# Patient Record
Sex: FEMALE | Race: WHITE | NOT HISPANIC OR LATINO | Employment: FULL TIME | URBAN - METROPOLITAN AREA
[De-identification: names, ages, dates, MRNs, and addresses within clinical notes are randomized per-mention and may not be internally consistent; named-entity substitution may affect disease eponyms.]

---

## 2022-03-01 ENCOUNTER — HOSPITAL ENCOUNTER (OUTPATIENT)
Dept: RADIOLOGY | Facility: MEDICAL CENTER | Age: 62
End: 2022-03-01
Attending: STUDENT IN AN ORGANIZED HEALTH CARE EDUCATION/TRAINING PROGRAM
Payer: COMMERCIAL

## 2022-03-01 DIAGNOSIS — Z12.31 VISIT FOR SCREENING MAMMOGRAM: ICD-10-CM

## 2022-03-01 PROCEDURE — 77063 BREAST TOMOSYNTHESIS BI: CPT

## 2022-07-22 ENCOUNTER — HOSPITAL ENCOUNTER (OUTPATIENT)
Facility: MEDICAL CENTER | Age: 62
End: 2022-07-22
Attending: PHYSICIAN ASSISTANT
Payer: COMMERCIAL

## 2022-07-22 ENCOUNTER — OFFICE VISIT (OUTPATIENT)
Dept: URGENT CARE | Facility: PHYSICIAN GROUP | Age: 62
End: 2022-07-22
Payer: COMMERCIAL

## 2022-07-22 VITALS
HEART RATE: 94 BPM | SYSTOLIC BLOOD PRESSURE: 134 MMHG | RESPIRATION RATE: 16 BRPM | BODY MASS INDEX: 24.98 KG/M2 | TEMPERATURE: 99.6 F | DIASTOLIC BLOOD PRESSURE: 78 MMHG | OXYGEN SATURATION: 93 % | WEIGHT: 141 LBS | HEIGHT: 63 IN

## 2022-07-22 DIAGNOSIS — R50.9 FEVER, UNSPECIFIED FEVER CAUSE: ICD-10-CM

## 2022-07-22 DIAGNOSIS — J06.9 VIRAL UPPER RESPIRATORY TRACT INFECTION: ICD-10-CM

## 2022-07-22 DIAGNOSIS — M79.10 MYALGIA: ICD-10-CM

## 2022-07-22 DIAGNOSIS — R51.9 NONINTRACTABLE HEADACHE, UNSPECIFIED CHRONICITY PATTERN, UNSPECIFIED HEADACHE TYPE: ICD-10-CM

## 2022-07-22 PROCEDURE — 0240U HCHG SARS-COV-2 COVID-19 NFCT DS RESP RNA 3 TRGT MIC: CPT

## 2022-07-22 PROCEDURE — 99202 OFFICE O/P NEW SF 15 MIN: CPT | Performed by: PHYSICIAN ASSISTANT

## 2022-07-22 NOTE — LETTER
July 22, 2022    To Whom It May Concern:         This is confirmation that Cee Danielle attended her scheduled appointment with Alia Kaplan P.A.-C. on 7/22/22. Please excuse patient from work tomorrow 7/22 pending covid testing.         If you have any questions please do not hesitate to call me at the phone number listed below.    Sincerely,          Alia Kaplan P.A.-C.  [phone]

## 2022-07-23 DIAGNOSIS — R50.9 FEVER, UNSPECIFIED FEVER CAUSE: ICD-10-CM

## 2022-07-23 LAB
FLUAV RNA SPEC QL NAA+PROBE: NEGATIVE
FLUBV RNA SPEC QL NAA+PROBE: NEGATIVE
SARS-COV-2 RNA RESP QL NAA+PROBE: DETECTED
SPECIMEN SOURCE: ABNORMAL

## 2022-07-23 NOTE — PROGRESS NOTES
"Subjective:   Cee Danielle is a 61 y.o. female who presents for Headache (Body ache, x 1 day/)     Onset of symptoms at 11AM today.  Headache, myalgias.  Dry mouth, no ST, cough, or runny nose  No fevers at home; took tylenol at home  No SOB.  Vaccinated, no covid this year.    Medications:  This patient does not have an active medication from one of the medication groupers.    Allergies:             Patient has no allergy information on record.    Surgical History:       No past surgical history on file.    Past Social Hx:  Cee Danielle  reports that she has never smoked. She has never used smokeless tobacco. She reports previous alcohol use. She reports that she does not use drugs.     Past Family Hx:   Cee Danielle family history is not on file.       Problem list, medications, and allergies reviewed by myself today in Epic.     Objective:     /78 (BP Location: Left arm, Patient Position: Sitting, BP Cuff Size: Adult)   Pulse 94   Temp 37.6 °C (99.6 °F) (Temporal)   Resp 16   Ht 1.6 m (5' 3\")   Wt 64 kg (141 lb)   SpO2 93%   BMI 24.98 kg/m²     Physical Exam  Vitals and nursing note reviewed.   Constitutional:       General: She is not in acute distress.     Appearance: Normal appearance. She is not ill-appearing.   HENT:      Head: Normocephalic.      Jaw: No trismus.      Right Ear: External ear normal. No drainage. A middle ear effusion is present. No mastoid tenderness. Tympanic membrane is not erythematous or bulging.      Left Ear: External ear normal. No drainage. A middle ear effusion is present. No mastoid tenderness. Tympanic membrane is not erythematous or bulging.      Nose: Congestion and rhinorrhea present.      Right Turbinates: Enlarged and swollen.      Left Turbinates: Enlarged and swollen.      Mouth/Throat:      Mouth: Mucous membranes are moist.      Tongue: No lesions. Tongue does not deviate from midline.      Palate: No lesions.      Pharynx: Uvula midline. Posterior " oropharyngeal erythema present. No oropharyngeal exudate or uvula swelling.      Tonsils: No tonsillar exudate or tonsillar abscesses.   Eyes:      General:         Right eye: No discharge.         Left eye: No discharge.      Extraocular Movements: Extraocular movements intact.   Cardiovascular:      Rate and Rhythm: Normal rate and regular rhythm.      Pulses: Normal pulses.      Heart sounds: Normal heart sounds.   Pulmonary:      Effort: Pulmonary effort is normal. No accessory muscle usage or respiratory distress.      Breath sounds: Normal breath sounds and air entry. No stridor or decreased air movement. No wheezing, rhonchi or rales.   Musculoskeletal:      Cervical back: Normal range of motion.   Lymphadenopathy:      Head:      Right side of head: No tonsillar adenopathy.      Left side of head: No tonsillar adenopathy.      Cervical: No cervical adenopathy.   Skin:     General: Skin is warm.      Findings: No rash.   Neurological:      Mental Status: She is alert.   Psychiatric:         Behavior: Behavior is cooperative.         Assessment/Plan:     Diagnosis and Associated Orders:     There are no diagnoses linked to this encounter.      Comments/MDM:    The patient's presenting symptoms and exam findings most likely are due to a viral etiology.     Symptomatic and supportive care:   Plenty of oral hydration and rest   Over the counter cough suppressant as directed.  Tylenol or ibuprofen for pain and fever as directed.   Warm salt water gargles for sore throat, soft foods, cool liquids.   Saline nasal spray, Flonase, and/or otc sudafed (if no history of hypertension) as a decongestant.   Infection control measures at home. Stay away from people, Hand washing, covering sneeze/cough, disinfect surfaces.   Remain home from work, school, and other populated environments while ill.  Overall, the patient is well-appearing. They are not hypoxic, afebrile, and a normal pulmonary exam.    Test for COVID-19 and  influenza performed if applicable. Result will be reviewed by myself. We will call/message back for positive results only and appropriate further instructions. Instructed to sign up for [employer] if they have not already. Result will be automatically released to [facility] application for patient review.       I personally reviewed prior external notes and test results pertinent to today's visit.  Red flags discussed as well as indications to present to the Emergency Department.  Supportive care, natural history, differential diagnoses, and indications for immediate follow-up discussed.  Patient expresses understanding and agrees to plan.  Patient denies any other questions or concerns.    Follow-up with the primary care physician for recheck, reevaluation, and consideration of further management.      Please note that this dictation was created using voice recognition software. I have made a reasonable attempt to correct obvious errors, but I expect that there are errors of grammar and possibly content that I did not discover before finalizing the note.    This note was electronically signed by Alia Kaplan PA-C

## 2022-10-17 ENCOUNTER — HOSPITAL ENCOUNTER (OUTPATIENT)
Dept: LAB | Facility: MEDICAL CENTER | Age: 62
End: 2022-10-17
Attending: STUDENT IN AN ORGANIZED HEALTH CARE EDUCATION/TRAINING PROGRAM
Payer: COMMERCIAL

## 2022-10-17 LAB
25(OH)D3 SERPL-MCNC: 26 NG/ML (ref 30–100)
ALBUMIN SERPL BCP-MCNC: 4.6 G/DL (ref 3.2–4.9)
ALBUMIN/GLOB SERPL: 1.7 G/DL
ALP SERPL-CCNC: 110 U/L (ref 30–99)
ALT SERPL-CCNC: 10 U/L (ref 2–50)
ANION GAP SERPL CALC-SCNC: 13 MMOL/L (ref 7–16)
AST SERPL-CCNC: 18 U/L (ref 12–45)
BASOPHILS # BLD AUTO: 0.5 % (ref 0–1.8)
BASOPHILS # BLD: 0.03 K/UL (ref 0–0.12)
BILIRUB SERPL-MCNC: 0.3 MG/DL (ref 0.1–1.5)
BUN SERPL-MCNC: 17 MG/DL (ref 8–22)
CALCIUM SERPL-MCNC: 9.4 MG/DL (ref 8.5–10.5)
CHLORIDE SERPL-SCNC: 104 MMOL/L (ref 96–112)
CO2 SERPL-SCNC: 24 MMOL/L (ref 20–33)
CREAT SERPL-MCNC: 0.67 MG/DL (ref 0.5–1.4)
EOSINOPHIL # BLD AUTO: 0.1 K/UL (ref 0–0.51)
EOSINOPHIL NFR BLD: 1.7 % (ref 0–6.9)
ERYTHROCYTE [DISTWIDTH] IN BLOOD BY AUTOMATED COUNT: 48.5 FL (ref 35.9–50)
GFR SERPLBLD CREATININE-BSD FMLA CKD-EPI: 99 ML/MIN/1.73 M 2
GLOBULIN SER CALC-MCNC: 2.7 G/DL (ref 1.9–3.5)
GLUCOSE SERPL-MCNC: 93 MG/DL (ref 65–99)
HCT VFR BLD AUTO: 41.3 % (ref 37–47)
HGB BLD-MCNC: 13.3 G/DL (ref 12–16)
IMM GRANULOCYTES # BLD AUTO: 0.01 K/UL (ref 0–0.11)
IMM GRANULOCYTES NFR BLD AUTO: 0.2 % (ref 0–0.9)
LYMPHOCYTES # BLD AUTO: 2.36 K/UL (ref 1–4.8)
LYMPHOCYTES NFR BLD: 40.3 % (ref 22–41)
MCH RBC QN AUTO: 30.5 PG (ref 27–33)
MCHC RBC AUTO-ENTMCNC: 32.2 G/DL (ref 33.6–35)
MCV RBC AUTO: 94.7 FL (ref 81.4–97.8)
MONOCYTES # BLD AUTO: 0.28 K/UL (ref 0–0.85)
MONOCYTES NFR BLD AUTO: 4.8 % (ref 0–13.4)
NEUTROPHILS # BLD AUTO: 3.08 K/UL (ref 2–7.15)
NEUTROPHILS NFR BLD: 52.5 % (ref 44–72)
NRBC # BLD AUTO: 0 K/UL
NRBC BLD-RTO: 0 /100 WBC
PLATELET # BLD AUTO: 257 K/UL (ref 164–446)
PMV BLD AUTO: 10.2 FL (ref 9–12.9)
POTASSIUM SERPL-SCNC: 4.3 MMOL/L (ref 3.6–5.5)
PROT SERPL-MCNC: 7.3 G/DL (ref 6–8.2)
RBC # BLD AUTO: 4.36 M/UL (ref 4.2–5.4)
SODIUM SERPL-SCNC: 141 MMOL/L (ref 135–145)
WBC # BLD AUTO: 5.9 K/UL (ref 4.8–10.8)

## 2022-10-17 PROCEDURE — 80053 COMPREHEN METABOLIC PANEL: CPT

## 2022-10-17 PROCEDURE — 36415 COLL VENOUS BLD VENIPUNCTURE: CPT

## 2022-10-17 PROCEDURE — 85025 COMPLETE CBC W/AUTO DIFF WBC: CPT

## 2022-10-17 PROCEDURE — 82306 VITAMIN D 25 HYDROXY: CPT

## 2025-01-08 ENCOUNTER — OFFICE VISIT (OUTPATIENT)
Dept: MEDICAL GROUP | Facility: PHYSICIAN GROUP | Age: 65
End: 2025-01-08
Payer: COMMERCIAL

## 2025-01-08 VITALS
SYSTOLIC BLOOD PRESSURE: 166 MMHG | DIASTOLIC BLOOD PRESSURE: 98 MMHG | OXYGEN SATURATION: 94 % | BODY MASS INDEX: 27.76 KG/M2 | TEMPERATURE: 97.3 F | HEART RATE: 75 BPM | WEIGHT: 162.6 LBS | HEIGHT: 64 IN | RESPIRATION RATE: 14 BRPM

## 2025-01-08 DIAGNOSIS — Z13.21 SCREENING FOR ENDOCRINE, NUTRITIONAL, METABOLIC AND IMMUNITY DISORDER: ICD-10-CM

## 2025-01-08 DIAGNOSIS — Z12.31 ENCOUNTER FOR SCREENING MAMMOGRAM FOR MALIGNANT NEOPLASM OF BREAST: ICD-10-CM

## 2025-01-08 DIAGNOSIS — Z11.4 SCREENING FOR HIV (HUMAN IMMUNODEFICIENCY VIRUS): ICD-10-CM

## 2025-01-08 DIAGNOSIS — R12 HEARTBURN: ICD-10-CM

## 2025-01-08 DIAGNOSIS — Z13.29 SCREENING FOR ENDOCRINE, NUTRITIONAL, METABOLIC AND IMMUNITY DISORDER: ICD-10-CM

## 2025-01-08 DIAGNOSIS — Z11.59 NEED FOR HEPATITIS C SCREENING TEST: ICD-10-CM

## 2025-01-08 DIAGNOSIS — R19.5 POSITIVE COLORECTAL CANCER SCREENING USING COLOGUARD TEST: ICD-10-CM

## 2025-01-08 DIAGNOSIS — Z13.228 SCREENING FOR ENDOCRINE, NUTRITIONAL, METABOLIC AND IMMUNITY DISORDER: ICD-10-CM

## 2025-01-08 DIAGNOSIS — F51.01 PRIMARY INSOMNIA: ICD-10-CM

## 2025-01-08 DIAGNOSIS — Z13.0 SCREENING FOR ENDOCRINE, NUTRITIONAL, METABOLIC AND IMMUNITY DISORDER: ICD-10-CM

## 2025-01-08 PROBLEM — K63.5 POLYP OF COLON: Status: ACTIVE | Noted: 2023-12-11

## 2025-01-08 PROCEDURE — 3077F SYST BP >= 140 MM HG: CPT

## 2025-01-08 PROCEDURE — 99214 OFFICE O/P EST MOD 30 MIN: CPT

## 2025-01-08 PROCEDURE — 3080F DIAST BP >= 90 MM HG: CPT

## 2025-01-08 ASSESSMENT — PATIENT HEALTH QUESTIONNAIRE - PHQ9: CLINICAL INTERPRETATION OF PHQ2 SCORE: 0

## 2025-01-08 NOTE — PROGRESS NOTES
Verbal consent was acquired by the patient to use [employer] ambient listening note generation during this visit     Subjective:     HPI:   History of Present Illness  The patient is a 64-year-old female presenting to establish care. She reports that it has been several years since she was last seen by her primary care provider.    She has no history of surgical interventions or significant past medical conditions. Her obstetric history includes 3 pregnancies, resulting in 3 living children, all of whom are healthy. She is currently sexually active. She has not been monitoring her blood pressure at home recently, although she owns a blood pressure cuff. She reports no symptoms of headache, chest pain, or shortness of breath. Her dietary habits include a moderate intake of salt and a high consumption of water.    She has been experiencing intermittent heartburn for the past 3 to 4 months, which is exacerbated when lying down in certain positions. She does not consume food before bedtime. She has found relief with Tums.    She also reports occasional sleep disturbances, often waking up early due to worry. Her sleep schedule typically involves going to bed around 9 or 10 PM and waking up between 5 and 6 AM. She is currently unemployed. She reports good quality sleep once asleep but occasionally struggles with initiating sleep.    She had a positive Cologuard test and underwent a colonoscopy in 2023, which was normal. She was advised to return for a follow-up but did not have insurance at the time. Her last mammogram was in March 2022. It has been a while since her last Pap smear, but she has never had an abnormal result.    SOCIAL HISTORY  She does not vape, smoke, or use alcohol. She has no history of drug use, including marijuana.    FAMILY HISTORY  She reports no family history of cancers, diabetes, heart disease, high blood pressure, high cholesterol, or strokes.    MEDICATIONS  Current: Tums        Assessment &  Plan:     Problem List Items Addressed This Visit       Positive colorectal cancer screening using Cologuard test    Relevant Orders    Referral to Gastroenterology    Heartburn    Relevant Medications    omeprazole (PRILOSEC) 20 MG delayed-release capsule     Other Visit Diagnoses       Primary insomnia        Screening for endocrine, nutritional, metabolic and immunity disorder        Relevant Orders    CBC WITHOUT DIFFERENTIAL    Lipid Profile    TSH WITH REFLEX TO FT4    Comp Metabolic Panel    Screening for HIV (human immunodeficiency virus)        Relevant Orders    HIV AG/AB COMBO ASSAY SCREENING    Need for hepatitis C screening test        Relevant Orders    HEP C VIRUS ANTIBODY    Encounter for screening mammogram for malignant neoplasm of breast        Relevant Orders    MA-SCREENING MAMMO BILAT W/CAD            Assessment & Plan  1. Health maintenance.  [facility] will be initiated. An order for a mammogram has been placed. A Pap smear is scheduled for the summer. A comprehensive panel of labs, including liver function, kidney function, blood counts, cholesterol, and thyroid, will be ordered. She is advised to follow up with the GI department within a week if there is no communication regarding the colonoscopy or GI referral.    2. Elevated BP reading  New findings.    She is advised to monitor her blood pressure at home several times a week, ensuring her feet are flat on the floor and she has been resting for 5 minutes prior to each reading. She is also encouraged to limit her sodium intake to between 2500 and 3000 mg per day. If her blood pressure remains persistently high, further discussion regarding management will be necessary.    3. Gastroesophageal reflux disease (GERD).  Chronic and ongoing   She is provided with a printout detailing foods to avoid and tips for managing GERD. A prescription for omeprazole 20 mg has been sent to [facility], to be taken approximately an hour before  dinner. She is advised to avoid eating before bedtime.    4. Insomnia.  Chronic, not at goal.    She is recommended to try a magnesium supplement called Calm, which contains a blend of 3 different types of magnesium, to aid in sleep.     Follow-up  The patient is scheduled for a follow-up visit in 6 weeks.          Health Maintenance: Orders placed as applicable to patient     Objective:     Exam:  Objective:  Vitals:    01/08/25 0954   BP: (!) 166/98   Pulse: 75   Resp: 14   Temp: 36.3 °C (97.3 °F)   SpO2: 94%     Physical Exam  Physical Exam  Cardiovascular:      Rate and Rhythm: Normal rate and regular rhythm.   Pulmonary:      Effort: Pulmonary effort is normal.      Breath sounds: Normal breath sounds.         Constitutional:       Appearance: Normal appearance.   Eyes:      Extraocular Movements: Extraocular movements intact.   Pulmonary:      Effort: Pulmonary effort is normal.   Neurological:      General: No focal deficit present.      Mental Status: She is alert and oriented to person, place, and time.   Psychiatric:         Mood and Affect: Mood normal.         Behavior: Behavior normal.       Return in about 6 weeks (around 2/19/2025) for lab follow up.    Please note that this dictation was created using voice recognition software. I have made every reasonable attempt to correct obvious errors, but I expect that there are errors of grammar and possibly content that I did not discover before finalizing the note.      Reviewed notes from [facility] 11/2023

## 2025-01-22 DIAGNOSIS — R12 HEARTBURN: ICD-10-CM

## 2025-02-20 ENCOUNTER — HOSPITAL ENCOUNTER (OUTPATIENT)
Dept: LAB | Facility: MEDICAL CENTER | Age: 65
End: 2025-02-20
Payer: COMMERCIAL

## 2025-02-20 DIAGNOSIS — Z13.228 SCREENING FOR ENDOCRINE, NUTRITIONAL, METABOLIC AND IMMUNITY DISORDER: ICD-10-CM

## 2025-02-20 DIAGNOSIS — Z11.4 SCREENING FOR HIV (HUMAN IMMUNODEFICIENCY VIRUS): ICD-10-CM

## 2025-02-20 DIAGNOSIS — Z13.29 SCREENING FOR ENDOCRINE, NUTRITIONAL, METABOLIC AND IMMUNITY DISORDER: ICD-10-CM

## 2025-02-20 DIAGNOSIS — Z13.21 SCREENING FOR ENDOCRINE, NUTRITIONAL, METABOLIC AND IMMUNITY DISORDER: ICD-10-CM

## 2025-02-20 DIAGNOSIS — Z11.59 NEED FOR HEPATITIS C SCREENING TEST: ICD-10-CM

## 2025-02-20 DIAGNOSIS — Z13.0 SCREENING FOR ENDOCRINE, NUTRITIONAL, METABOLIC AND IMMUNITY DISORDER: ICD-10-CM

## 2025-02-20 LAB
ERYTHROCYTE [DISTWIDTH] IN BLOOD BY AUTOMATED COUNT: 47.1 FL (ref 35.9–50)
HCT VFR BLD AUTO: 42 % (ref 37–47)
HGB BLD-MCNC: 13.7 G/DL (ref 12–16)
MCH RBC QN AUTO: 29.8 PG (ref 27–33)
MCHC RBC AUTO-ENTMCNC: 32.6 G/DL (ref 32.2–35.5)
MCV RBC AUTO: 91.3 FL (ref 81.4–97.8)
PLATELET # BLD AUTO: 202 K/UL (ref 164–446)
PMV BLD AUTO: 10.8 FL (ref 9–12.9)
RBC # BLD AUTO: 4.6 M/UL (ref 4.2–5.4)
WBC # BLD AUTO: 6.4 K/UL (ref 4.8–10.8)

## 2025-02-20 PROCEDURE — 86803 HEPATITIS C AB TEST: CPT

## 2025-02-20 PROCEDURE — 80053 COMPREHEN METABOLIC PANEL: CPT

## 2025-02-20 PROCEDURE — 85027 COMPLETE CBC AUTOMATED: CPT

## 2025-02-20 PROCEDURE — 36415 COLL VENOUS BLD VENIPUNCTURE: CPT

## 2025-02-20 PROCEDURE — 80061 LIPID PANEL: CPT

## 2025-02-20 PROCEDURE — 84443 ASSAY THYROID STIM HORMONE: CPT

## 2025-02-20 PROCEDURE — 87389 HIV-1 AG W/HIV-1&-2 AB AG IA: CPT

## 2025-02-21 LAB
ALBUMIN SERPL BCP-MCNC: 4.3 G/DL (ref 3.2–4.9)
ALBUMIN/GLOB SERPL: 1.3 G/DL
ALP SERPL-CCNC: 99 U/L (ref 30–99)
ALT SERPL-CCNC: 12 U/L (ref 2–50)
ANION GAP SERPL CALC-SCNC: 13 MMOL/L (ref 7–16)
AST SERPL-CCNC: 26 U/L (ref 12–45)
BILIRUB SERPL-MCNC: 0.4 MG/DL (ref 0.1–1.5)
BUN SERPL-MCNC: 15 MG/DL (ref 8–22)
CALCIUM ALBUM COR SERPL-MCNC: 8.9 MG/DL (ref 8.5–10.5)
CALCIUM SERPL-MCNC: 9.1 MG/DL (ref 8.5–10.5)
CHLORIDE SERPL-SCNC: 107 MMOL/L (ref 96–112)
CHOLEST SERPL-MCNC: 203 MG/DL (ref 100–199)
CO2 SERPL-SCNC: 20 MMOL/L (ref 20–33)
CREAT SERPL-MCNC: 0.67 MG/DL (ref 0.5–1.4)
GFR SERPLBLD CREATININE-BSD FMLA CKD-EPI: 97 ML/MIN/1.73 M 2
GLOBULIN SER CALC-MCNC: 3.2 G/DL (ref 1.9–3.5)
GLUCOSE SERPL-MCNC: 82 MG/DL (ref 65–99)
HCV AB SER QL: NORMAL
HDLC SERPL-MCNC: 49 MG/DL
HIV 1+2 AB+HIV1 P24 AG SERPL QL IA: NORMAL
LDLC SERPL CALC-MCNC: 113 MG/DL
POTASSIUM SERPL-SCNC: 4.2 MMOL/L (ref 3.6–5.5)
PROT SERPL-MCNC: 7.5 G/DL (ref 6–8.2)
SODIUM SERPL-SCNC: 140 MMOL/L (ref 135–145)
TRIGL SERPL-MCNC: 206 MG/DL (ref 0–149)
TSH SERPL DL<=0.005 MIU/L-ACNC: 1.94 UIU/ML (ref 0.38–5.33)

## 2025-02-25 ENCOUNTER — OFFICE VISIT (OUTPATIENT)
Dept: MEDICAL GROUP | Facility: PHYSICIAN GROUP | Age: 65
End: 2025-02-25
Payer: COMMERCIAL

## 2025-02-25 VITALS
BODY MASS INDEX: 29.02 KG/M2 | DIASTOLIC BLOOD PRESSURE: 86 MMHG | HEIGHT: 63 IN | RESPIRATION RATE: 18 BRPM | OXYGEN SATURATION: 92 % | WEIGHT: 163.8 LBS | SYSTOLIC BLOOD PRESSURE: 146 MMHG | TEMPERATURE: 97.8 F | HEART RATE: 77 BPM

## 2025-02-25 DIAGNOSIS — R12 HEARTBURN: ICD-10-CM

## 2025-02-25 DIAGNOSIS — E78.1 HYPERTRIGLYCERIDEMIA: ICD-10-CM

## 2025-02-25 DIAGNOSIS — I10 PRIMARY HYPERTENSION: ICD-10-CM

## 2025-02-25 DIAGNOSIS — Z23 NEED FOR VACCINATION: ICD-10-CM

## 2025-02-25 PROCEDURE — 90677 PCV20 VACCINE IM: CPT

## 2025-02-25 PROCEDURE — 90471 IMMUNIZATION ADMIN: CPT

## 2025-02-25 PROCEDURE — 99214 OFFICE O/P EST MOD 30 MIN: CPT | Mod: 25

## 2025-02-25 PROCEDURE — 3077F SYST BP >= 140 MM HG: CPT

## 2025-02-25 PROCEDURE — 3079F DIAST BP 80-89 MM HG: CPT

## 2025-02-25 RX ORDER — TELMISARTAN 40 MG/1
40 TABLET ORAL DAILY
Qty: 100 TABLET | Refills: 3 | Status: SHIPPED | OUTPATIENT
Start: 2025-02-25 | End: 2026-04-01

## 2025-02-25 ASSESSMENT — FIBROSIS 4 INDEX: FIB4 SCORE: 2.38

## 2025-02-27 ENCOUNTER — RESULTS FOLLOW-UP (OUTPATIENT)
Dept: MEDICAL GROUP | Facility: PHYSICIAN GROUP | Age: 65
End: 2025-02-27

## 2025-02-28 ENCOUNTER — HOSPITAL ENCOUNTER (OUTPATIENT)
Dept: RADIOLOGY | Facility: MEDICAL CENTER | Age: 65
End: 2025-02-28
Payer: COMMERCIAL

## 2025-02-28 DIAGNOSIS — Z12.31 ENCOUNTER FOR SCREENING MAMMOGRAM FOR MALIGNANT NEOPLASM OF BREAST: ICD-10-CM

## 2025-02-28 PROCEDURE — 77067 SCR MAMMO BI INCL CAD: CPT

## 2025-02-28 NOTE — PROGRESS NOTES
Verbal consent was acquired by the patient to use [employer] ambient listening note generation during this visit     Subjective:     HPI:   History of Present Illness  The patient is a 64-year-old female presenting for a follow-up on laboratory results. She was referred to a gastroenterologist for gastroesophageal reflux disease (GERD) and initiated on omeprazole 20 mg daily. She was advised to monitor her blood pressure (BP) at home, with her last recorded BP being 166/98 mmHg during the previous visit.    The patient reports experiencing intermittent reflux symptoms, which have shown improvement with the use of omeprazole. She is scheduled for a colonoscopy on 04/01/2025.    Home BP readings have been in the range of 130-140 mmHg, with no associated symptoms reported. The patient admits to a high intake of salt, chips, and sugar.    She expresses concern about weight gain despite engaging in regular exercise and maintaining a balanced diet. Additionally, she is planning to transition to Medicare this year.    MEDICATIONS  omeprazole        Assessment & Plan:     Problem List Items Addressed This Visit       Heartburn     Other Visit Diagnoses         Primary hypertension        Relevant Medications    telmisartan (MICARDIS) 40 MG Tab      Need for vaccination        Relevant Orders    Pneumococcal Conjugate Vaccine 20-Valent (6 wks+) (Completed)              Assessment & Plan  1. GERD: Omeprazole 20 mg daily helps, occasional reflux persists. GI appointment on 04/01/2025 for colonoscopy.  - Continue omeprazole 20 mg daily  - [facility] for [facility]oscopy on 04/01/2025    2. Hypertension: /86 today (previously 166/98). Home readings 130-140 systolic.  - Advise reducing salt intake and increasing water intake  - Start telmisartan 40mg  QHS at night  - Discussed potential side effects including rare persistent dry cough and initial dizziness or vertigo    3. Elevated Triglycerides: Chronic and  ongoing   Triglycerides Slightly elevated.  Lab Results   Component Value Date/Time    CHOLSTRLTOT 203 (H) 02/20/2025 10:14 AM    TRIGLYCERIDE 206 (H) 02/20/2025 10:14 AM    HDL 49 02/20/2025 10:14 AM     (H) 02/20/2025 10:14 AM   ]    - Advise healthier snacks such as protein chips and nuts  - Telmisartan may help lower triglycerides    4. Weight Gain: Reports weight gain despite exercise and diet.  - Discussed GLP-1 medications, prefers to wait  - Advise weight-bearing exercises to increase muscle mass and metabolic rate    5. Health Maintenance: Pneumonia vaccine today per CDC recommendation.  - Administered pneumonia vaccine today      Follow-up  - Recheck BP and medication tolerance in 4-6 weeks      Health Maintenance: Orders placed as applicable to patient     Objective:     Exam:  Objective:  Vitals:    02/25/25 1011   BP: (!) 146/86   Pulse: 77   Resp: 18   Temp: 36.6 °C (97.8 °F)   SpO2: 92%     Physical Exam  Physical Exam    Constitutional:       Appearance: Normal appearance.   Eyes:      Extraocular Movements: Extraocular movements intact.   Pulmonary:      Effort: Pulmonary effort is normal.   Neurological:      General: No focal deficit present.      Mental Status: She is alert and oriented to person, place, and time.   Psychiatric:         Mood and Affect: Mood normal.         Behavior: Behavior normal.       Return in about 8 weeks (around 4/22/2025) for HTN follow up.    Please note that this dictation was created using voice recognition software. I have made every reasonable attempt to correct obvious errors, but I expect that there are errors of grammar and possibly content that I did not discover before finalizing the note.

## 2025-03-05 ENCOUNTER — RESULTS FOLLOW-UP (OUTPATIENT)
Dept: MEDICAL GROUP | Facility: PHYSICIAN GROUP | Age: 65
End: 2025-03-05
Payer: COMMERCIAL

## 2025-03-06 NOTE — TELEPHONE ENCOUNTER
Phone Number Called: [phone] (home)       Call outcome:  Unable to leave voicemail    Message: will try tomorrow

## 2025-03-06 NOTE — TELEPHONE ENCOUNTER
----- Message from Nurse Practitioner RAKESH Sung sent at 3/5/2025  7:38 AM PST -----  Krystle Mike,    I have reviewed the results of your mammogram and it showed that you have scattered areas of fibroglandular tissue (normal for you) with no evidence of malignancy or masses.  We will repeat your screening screening mammogram in one year if you have a family history of breast cancer or risk factors for breast cancer, or two years if you have low risk factors. Please let me know if you have any questions or feel free to make an appointment with me if you would like to discuss this further.    Thank you,  ANITA Sung

## 2025-04-22 ENCOUNTER — OFFICE VISIT (OUTPATIENT)
Dept: MEDICAL GROUP | Facility: PHYSICIAN GROUP | Age: 65
End: 2025-04-22
Payer: COMMERCIAL

## 2025-04-22 VITALS
HEART RATE: 61 BPM | BODY MASS INDEX: 28.17 KG/M2 | WEIGHT: 159 LBS | HEIGHT: 63 IN | OXYGEN SATURATION: 92 % | TEMPERATURE: 97.2 F

## 2025-04-22 DIAGNOSIS — I10 PRIMARY HYPERTENSION: ICD-10-CM

## 2025-04-22 DIAGNOSIS — D12.6 COLON ADENOMAS: ICD-10-CM

## 2025-04-22 DIAGNOSIS — E78.1 HYPERTRIGLYCERIDEMIA: ICD-10-CM

## 2025-04-22 PROCEDURE — 99214 OFFICE O/P EST MOD 30 MIN: CPT

## 2025-04-22 RX ORDER — TELMISARTAN 80 MG/1
80 TABLET ORAL DAILY
Qty: 100 TABLET | Refills: 3 | Status: SHIPPED | OUTPATIENT
Start: 2025-04-22 | End: 2026-05-27

## 2025-04-22 ASSESSMENT — FIBROSIS 4 INDEX: FIB4 SCORE: 2.38

## 2025-04-22 NOTE — PROGRESS NOTES
Verbal consent was acquired by the patient to use [employer] ambient listening note generation during this visit     Subjective:     HPI:   History of Present Illness  The patient is a 64-year-old female presenting for a follow-up appointment. She was initiated on telmisartan 40 mg at bedtime for hypertension management.    Her systolic blood pressure ranges from 130 to 140 mmHg. She reports no adverse effects from telmisartan and denies experiencing headaches, chest pain, or shortness of breath. She consumes wine occasionally but does not engage in heavy drinking.    A colonoscopy performed last week revealed 10 adenomatous polyps, which were removed. A repeat colonoscopy is scheduled in 6 months.    SOCIAL HISTORY  Occasional wine consumption, not a heavy drinker.        Assessment & Plan:     Problem List Items Addressed This Visit       Primary hypertension    Relevant Medications    telmisartan (MICARDIS) 80 MG Tab    Hypertriglyceridemia    Relevant Medications    telmisartan (MICARDIS) 80 MG Tab    Other Relevant Orders    Lipid Profile    Colon adenomas         Assessment & Plan  1. Hypertension: Chronic, not at goal.    - Home BP: 130s-140s systolic, 30-40 diastolic.  - No side effects with telmisartan; increase dose to 80mg QHS.   - Target BP: 120-130/70-80.  - Prescription for increased dose sent to [facility].  - Occasional wine acceptable.  - Recheck BP before leaving.    2. Hyperlipidemia. This is a chronic, stable medical condition.   - Telmisartan aids in lowering triglycerides.  - Recheck cholesterol labs in 2 months.  - Lab order placed; advised to get labs before next visit.    3. Colon polyps: Precancerous.  - Multiple adenomas removed during last colonoscopy.  - Repeat colonoscopy in 6 months.  - If no new polyps, subsequent colonoscopies every 3 years.    Follow-up  - Follow-up in 2 months.      Health Maintenance: Orders placed as applicable to patient     Objective:      Exam:  Objective:  Vitals:    04/22/25 0918   Pulse: 61   Temp: 36.2 °C (97.2 °F)   SpO2: 92%     Physical Exam  Physical Exam    Constitutional:       Appearance: Normal appearance.   Eyes:      Extraocular Movements: Extraocular movements intact.   Pulmonary:      Effort: Pulmonary effort is normal.   Neurological:      General: No focal deficit present.      Mental Status: She is alert and oriented to person, place, and time.   Psychiatric:         Mood and Affect: Mood normal.         Behavior: Behavior normal.       Return in about 2 months (around 6/22/2025) for HTN follow up.    Please note that this dictation was created using voice recognition software. I have made every reasonable attempt to correct obvious errors, but I expect that there are errors of grammar and possibly content that I did not discover before finalizing the note.

## 2025-06-16 ENCOUNTER — HOSPITAL ENCOUNTER (OUTPATIENT)
Dept: LAB | Facility: MEDICAL CENTER | Age: 65
End: 2025-06-16
Payer: COMMERCIAL

## 2025-06-16 DIAGNOSIS — E78.1 HYPERTRIGLYCERIDEMIA: ICD-10-CM

## 2025-06-16 LAB
CHOLEST SERPL-MCNC: 187 MG/DL (ref 100–199)
FASTING STATUS PATIENT QL REPORTED: NORMAL
HDLC SERPL-MCNC: 40 MG/DL
LDLC SERPL CALC-MCNC: 88 MG/DL
TRIGL SERPL-MCNC: 294 MG/DL (ref 0–149)

## 2025-06-16 PROCEDURE — 80061 LIPID PANEL: CPT

## 2025-06-16 PROCEDURE — 36415 COLL VENOUS BLD VENIPUNCTURE: CPT

## 2025-06-17 ENCOUNTER — RESULTS FOLLOW-UP (OUTPATIENT)
Dept: MEDICAL GROUP | Facility: PHYSICIAN GROUP | Age: 65
End: 2025-06-17

## 2025-06-19 ENCOUNTER — TELEPHONE (OUTPATIENT)
Dept: MEDICAL GROUP | Facility: PHYSICIAN GROUP | Age: 65
End: 2025-06-19
Payer: COMMERCIAL

## 2025-06-19 NOTE — TELEPHONE ENCOUNTER
Phone Number Called: [phone]     Call outcome: could not leave message vm not set up patient has appointment on 06/23 to go over lab results

## 2025-06-23 ENCOUNTER — OFFICE VISIT (OUTPATIENT)
Dept: MEDICAL GROUP | Facility: PHYSICIAN GROUP | Age: 65
End: 2025-06-23
Payer: COMMERCIAL

## 2025-06-23 VITALS
OXYGEN SATURATION: 94 % | HEIGHT: 63 IN | DIASTOLIC BLOOD PRESSURE: 86 MMHG | WEIGHT: 162.2 LBS | BODY MASS INDEX: 28.74 KG/M2 | SYSTOLIC BLOOD PRESSURE: 176 MMHG | TEMPERATURE: 98.4 F | HEART RATE: 79 BPM | RESPIRATION RATE: 16 BRPM

## 2025-06-23 DIAGNOSIS — I10 PRIMARY HYPERTENSION: Primary | ICD-10-CM

## 2025-06-23 DIAGNOSIS — R20.2 NUMBNESS AND TINGLING: ICD-10-CM

## 2025-06-23 DIAGNOSIS — B00.2 ORAL HERPES: ICD-10-CM

## 2025-06-23 DIAGNOSIS — R20.0 NUMBNESS AND TINGLING: ICD-10-CM

## 2025-06-23 PROCEDURE — 3077F SYST BP >= 140 MM HG: CPT

## 2025-06-23 PROCEDURE — 3079F DIAST BP 80-89 MM HG: CPT

## 2025-06-23 PROCEDURE — 99214 OFFICE O/P EST MOD 30 MIN: CPT

## 2025-06-23 RX ORDER — HYDROCHLOROTHIAZIDE 12.5 MG/1
12.5 TABLET ORAL DAILY
Qty: 30 TABLET | Refills: 3 | Status: SHIPPED | OUTPATIENT
Start: 2025-06-23

## 2025-06-23 RX ORDER — VALACYCLOVIR HYDROCHLORIDE 1 G/1
1000 TABLET, FILM COATED ORAL 2 TIMES DAILY
Qty: 14 TABLET | Refills: 3 | Status: SHIPPED | OUTPATIENT
Start: 2025-06-23 | End: 2025-06-30

## 2025-06-23 ASSESSMENT — FIBROSIS 4 INDEX: FIB4 SCORE: 2.38

## 2025-06-23 NOTE — PROGRESS NOTES
Verbal consent was acquired by the patient to use [employer] ambient listening note generation during this visit     Subjective:     HPI:   History of Present Illness  The patient, a 64-year-old female, presents for follow-up regarding hypertension and laboratory results.    Home blood pressure readings consistently range from 160 to 170 mmHg. She administers her antihypertensive medication between 7:00-8:00 AM and monitors her blood pressure in the afternoon and evening. The patient has a preference for salty foods and consumes a significant amount of chips. She is currently unemployed, consumes wine on weekends, and denies smoking or other substance use.    The patient reports experiencing paresthesia in her toes, predominantly in the left foot, lasting approximately one hour and occurring intermittently during periods of sitting and standing, primarily while seated. She also describes occasional cramps in her toes upon waking, which have been present for several months. There is no history of foot fractures, severe strains, sprains, or ankle injuries. The paresthesia and tingling occasionally ascend up the leg, though she does not report any back pain or difficulty with foot dorsiflexion while walking. She describes a wrapping pattern of paresthesia and tingling in her toes. Mild paresthesia is noted on the right side, less severe than on the left. She experiences occasional leg cramps that resolve upon ambulation. The patient is taking over-the-counter magnesium supplements.    She has a history of herpes labialis and currently has six lesions. She has attempted over-the-counter treatments for one week with partial relief.    The patient fell one month ago, tripping over a crack and sustaining a face-first fall that resulted in a minor epistaxis. Pain was managed with acetaminophen and ibuprofen. She did not experience dizziness or lightheadedness prior to the fall. Her nose was slightly swollen but is  recovering. She reports no post-fall dizziness or syncope but perceives weakness in her left leg.    The patient does not snore but reports experiencing fatigue.    SOCIAL HISTORY  Drinks wine on weekends. No smoking or substance use. Currently unemployed.        Assessment & Plan:     Problem List Items Addressed This Visit       Primary hypertension - Primary    Relevant Medications    hydroCHLOROthiazide 12.5 MG tablet    Other Relevant Orders    Comp Metabolic Panel     Other Visit Diagnoses         Oral herpes        Relevant Medications    valacyclovir (VALTREX) 1 g Tab      Numbness and tingling        Relevant Orders    Referral to Pain Clinic              Assessment & Plan  1. Hypertension: Chronic, uncontrolled.   - BP readings 160s-170s despite increased antihypertensive medication.  - Advised to limit sodium intake to 3000 mg/day and monitor diet.  - Telmisartan at night, hydrochlorothiazide in morning.  - Lab test for kidney function and electrolytes.    2. Numbness and tingling in toes: Chronic. Undiagnosed concern with uncertain prognosis. Prediminantly affects 2nd-5th toes, occasionally the great toe. Occasionally affects the right toes. Nonspecific dermatomal distribution.   - Numbness and tingling in toes, mainly left foot, for 2 months.  - Decreased sensation to sharp and dull sensation in the LLE.   - Advised magnesium at night  - EMG if no improvement with magnesium.    3. Cold sores: Acute.  - Cold sores for a week; OTC treatments partially effective.  - Valacyclovir 500 mg twice daily for one week; start medication at initial symptoms.  - Counseling on early antiviral intervention.    4. Nasal injury: Acute.  - Fell a month ago, minor nasal swelling.  - Apply ice daily; spoon method for relief.  - Imaging if breathing difficulties arise.    Follow-up in 6 weeks.    Return in about 6 weeks (around 8/4/2025) for HTN follow up.    Health Maintenance: Orders placed as applicable to patient      Objective:     Exam:  Objective:  Vitals:    06/23/25 1032   BP: (!) 176/86   Pulse: 79   Resp: 16   Temp: 36.9 °C (98.4 °F)   SpO2: 94%     Physical Exam  Physical Exam  Neurological:      Sensory: Sensory deficit present.      Motor: Motor function is intact. No pronator drift.      Comments: RLE, nonspecific pattern         Constitutional:       Appearance: Normal appearance.   Eyes:      Extraocular Movements: Extraocular movements intact.   Pulmonary:      Effort: Pulmonary effort is normal.   Neurological:      General: No focal deficit present.      Mental Status: She is alert and oriented to person, place, and time.   Psychiatric:         Mood and Affect: Mood normal.         Behavior: Behavior normal.       Please note that this dictation was created using voice recognition software. I have made every reasonable attempt to correct obvious errors, but I expect that there are errors of grammar and possibly content that I did not discover before finalizing the note.

## 2025-08-08 ENCOUNTER — HOSPITAL ENCOUNTER (OUTPATIENT)
Dept: LAB | Facility: MEDICAL CENTER | Age: 65
End: 2025-08-08
Payer: COMMERCIAL

## 2025-08-08 DIAGNOSIS — I10 PRIMARY HYPERTENSION: ICD-10-CM

## 2025-08-08 LAB
ALBUMIN SERPL BCP-MCNC: 4.3 G/DL (ref 3.2–4.9)
ALBUMIN/GLOB SERPL: 1.3 G/DL
ALP SERPL-CCNC: 102 U/L (ref 30–99)
ALT SERPL-CCNC: 14 U/L (ref 2–50)
ANION GAP SERPL CALC-SCNC: 13 MMOL/L (ref 7–16)
AST SERPL-CCNC: 26 U/L (ref 12–45)
BILIRUB SERPL-MCNC: 0.4 MG/DL (ref 0.1–1.5)
BUN SERPL-MCNC: 15 MG/DL (ref 8–22)
CALCIUM ALBUM COR SERPL-MCNC: 9.4 MG/DL (ref 8.5–10.5)
CALCIUM SERPL-MCNC: 9.6 MG/DL (ref 8.5–10.5)
CHLORIDE SERPL-SCNC: 103 MMOL/L (ref 96–112)
CO2 SERPL-SCNC: 21 MMOL/L (ref 20–33)
CREAT SERPL-MCNC: 0.8 MG/DL (ref 0.5–1.4)
GFR SERPLBLD CREATININE-BSD FMLA CKD-EPI: 82 ML/MIN/1.73 M 2
GLOBULIN SER CALC-MCNC: 3.4 G/DL (ref 1.9–3.5)
GLUCOSE SERPL-MCNC: 88 MG/DL (ref 65–99)
POTASSIUM SERPL-SCNC: 3.9 MMOL/L (ref 3.6–5.5)
PROT SERPL-MCNC: 7.7 G/DL (ref 6–8.2)
SODIUM SERPL-SCNC: 137 MMOL/L (ref 135–145)

## 2025-08-08 PROCEDURE — 36415 COLL VENOUS BLD VENIPUNCTURE: CPT

## 2025-08-08 PROCEDURE — 80053 COMPREHEN METABOLIC PANEL: CPT

## 2025-08-18 ENCOUNTER — TELEPHONE (OUTPATIENT)
Dept: MEDICAL GROUP | Facility: PHYSICIAN GROUP | Age: 65
End: 2025-08-18
Payer: COMMERCIAL

## 2025-08-26 ENCOUNTER — TELEPHONE (OUTPATIENT)
Dept: MEDICAL GROUP | Facility: PHYSICIAN GROUP | Age: 65
End: 2025-08-26
Payer: COMMERCIAL

## 2025-08-27 ENCOUNTER — OFFICE VISIT (OUTPATIENT)
Dept: MEDICAL GROUP | Facility: PHYSICIAN GROUP | Age: 65
End: 2025-08-27
Payer: COMMERCIAL

## 2025-08-27 ENCOUNTER — APPOINTMENT (OUTPATIENT)
Dept: MEDICAL GROUP | Facility: PHYSICIAN GROUP | Age: 65
End: 2025-08-27
Payer: COMMERCIAL

## 2025-08-27 VITALS
DIASTOLIC BLOOD PRESSURE: 80 MMHG | WEIGHT: 163 LBS | SYSTOLIC BLOOD PRESSURE: 122 MMHG | BODY MASS INDEX: 28.88 KG/M2 | HEART RATE: 72 BPM | TEMPERATURE: 97 F | RESPIRATION RATE: 20 BRPM | OXYGEN SATURATION: 93 % | HEIGHT: 63 IN

## 2025-08-27 DIAGNOSIS — B00.1 COLD SORE: ICD-10-CM

## 2025-08-27 DIAGNOSIS — E66.3 OVERWEIGHT: ICD-10-CM

## 2025-08-27 DIAGNOSIS — E78.1 HYPERTRIGLYCERIDEMIA: ICD-10-CM

## 2025-08-27 DIAGNOSIS — K21.9 GASTROESOPHAGEAL REFLUX DISEASE WITHOUT ESOPHAGITIS: Primary | ICD-10-CM

## 2025-08-27 DIAGNOSIS — I10 PRIMARY HYPERTENSION: ICD-10-CM

## 2025-08-27 PROCEDURE — 99214 OFFICE O/P EST MOD 30 MIN: CPT | Performed by: INTERNAL MEDICINE

## 2025-08-27 PROCEDURE — 3074F SYST BP LT 130 MM HG: CPT | Performed by: INTERNAL MEDICINE

## 2025-08-27 PROCEDURE — 3079F DIAST BP 80-89 MM HG: CPT | Performed by: INTERNAL MEDICINE

## 2025-08-27 RX ORDER — VALACYCLOVIR HYDROCHLORIDE 1 G/1
1000 TABLET, FILM COATED ORAL 2 TIMES DAILY
COMMUNITY
Start: 2025-07-05 | End: 2025-07-12

## 2025-08-27 RX ORDER — HYDROCHLOROTHIAZIDE 12.5 MG/1
12.5 TABLET ORAL DAILY
Qty: 30 TABLET | Refills: 3 | Status: SHIPPED | OUTPATIENT
Start: 2025-08-27

## 2025-08-27 RX ORDER — OMEPRAZOLE 20 MG/1
20 CAPSULE, DELAYED RELEASE ORAL DAILY
Qty: 90 CAPSULE | Refills: 1 | Status: SHIPPED | OUTPATIENT
Start: 2025-08-27

## 2025-08-27 ASSESSMENT — FIBROSIS 4 INDEX: FIB4 SCORE: 2.24
